# Patient Record
Sex: MALE | Race: WHITE | Employment: FULL TIME | URBAN - METROPOLITAN AREA
[De-identification: names, ages, dates, MRNs, and addresses within clinical notes are randomized per-mention and may not be internally consistent; named-entity substitution may affect disease eponyms.]

---

## 2019-10-06 ENCOUNTER — HOSPITAL ENCOUNTER (EMERGENCY)
Age: 29
Discharge: HOME | End: 2019-10-06
Payer: COMMERCIAL

## 2019-10-06 VITALS
WEIGHT: 195 LBS | OXYGEN SATURATION: 100 % | RESPIRATION RATE: 12 BRPM | HEART RATE: 87 BPM | TEMPERATURE: 98.2 F | BODY MASS INDEX: 25.03 KG/M2 | HEIGHT: 74 IN | SYSTOLIC BLOOD PRESSURE: 125 MMHG | DIASTOLIC BLOOD PRESSURE: 88 MMHG

## 2019-10-06 DIAGNOSIS — L02.411 ABSCESS OF RIGHT AXILLA: Primary | ICD-10-CM

## 2019-10-06 PROCEDURE — 4500000028 HC INTERMEDIATE PROCEDURE

## 2019-10-06 PROCEDURE — 2500000003 HC RX 250 WO HCPCS: Performed by: PHYSICIAN ASSISTANT

## 2019-10-06 PROCEDURE — 6370000000 HC RX 637 (ALT 250 FOR IP): Performed by: PHYSICIAN ASSISTANT

## 2019-10-06 PROCEDURE — 99282 EMERGENCY DEPT VISIT SF MDM: CPT

## 2019-10-06 RX ORDER — LIDOCAINE HYDROCHLORIDE 20 MG/ML
10 INJECTION, SOLUTION INFILTRATION; PERINEURAL ONCE
Status: COMPLETED | OUTPATIENT
Start: 2019-10-06 | End: 2019-10-06

## 2019-10-06 RX ORDER — SULFAMETHOXAZOLE AND TRIMETHOPRIM 800; 160 MG/1; MG/1
1 TABLET ORAL ONCE
Status: COMPLETED | OUTPATIENT
Start: 2019-10-06 | End: 2019-10-06

## 2019-10-06 RX ORDER — SULFAMETHOXAZOLE AND TRIMETHOPRIM 800; 160 MG/1; MG/1
1 TABLET ORAL 2 TIMES DAILY
Qty: 14 TABLET | Refills: 0 | Status: SHIPPED | OUTPATIENT
Start: 2019-10-06 | End: 2019-10-13

## 2019-10-06 RX ADMIN — LIDOCAINE HYDROCHLORIDE 10 ML: 20 INJECTION, SOLUTION INFILTRATION; PERINEURAL at 13:06

## 2019-10-06 RX ADMIN — SULFAMETHOXAZOLE AND TRIMETHOPRIM 1 TABLET: 800; 160 TABLET ORAL at 13:06

## 2019-10-06 ASSESSMENT — PAIN SCALES - GENERAL: PAINLEVEL_OUTOF10: 5

## 2019-10-06 ASSESSMENT — PAIN DESCRIPTION - ORIENTATION: ORIENTATION: LEFT

## 2019-10-06 ASSESSMENT — PAIN DESCRIPTION - PAIN TYPE: TYPE: ACUTE PAIN

## 2019-10-06 ASSESSMENT — PAIN DESCRIPTION - LOCATION: LOCATION: ARM

## 2023-10-17 LAB
ALBUMIN SERPL-MCNC: 4.8 G/DL
ALP BLD-CCNC: 102 U/L
ALT SERPL-CCNC: 42 U/L
ANION GAP SERPL CALCULATED.3IONS-SCNC: 2.2 MMOL/L
AST SERPL-CCNC: 39 U/L
AVERAGE GLUCOSE: NORMAL
BASOPHILS ABSOLUTE: 0.1 /ΜL
BASOPHILS RELATIVE PERCENT: 1 %
BILIRUB SERPL-MCNC: 1.3 MG/DL (ref 0.1–1.4)
BUN BLDV-MCNC: 16 MG/DL
CALCIUM SERPL-MCNC: 9.5 MG/DL
CHLORIDE BLD-SCNC: 101 MMOL/L
CHOLESTEROL, TOTAL: 236 MG/DL
CHOLESTEROL/HDL RATIO: ABNORMAL
CO2: NORMAL
CREAT SERPL-MCNC: 1.05 MG/DL
EGFR: 95
EOSINOPHILS ABSOLUTE: 0.4 /ΜL
EOSINOPHILS RELATIVE PERCENT: 5 %
GLUCOSE BLD-MCNC: 85 MG/DL
HBA1C MFR BLD: 5.5 %
HCT VFR BLD CALC: 44.2 % (ref 41–53)
HDLC SERPL-MCNC: 50 MG/DL (ref 35–70)
HEMOGLOBIN: 14.8 G/DL (ref 13.5–17.5)
LDL CHOLESTEROL CALCULATED: 178 MG/DL (ref 0–160)
LYMPHOCYTES ABSOLUTE: 2.9 /ΜL
LYMPHOCYTES RELATIVE PERCENT: 36 %
MCH RBC QN AUTO: 29.7 PG
MCHC RBC AUTO-ENTMCNC: 33.5 G/DL
MCV RBC AUTO: 89 FL
MONOCYTES ABSOLUTE: 0.7 /ΜL
MONOCYTES RELATIVE PERCENT: 9 %
NEUTROPHILS ABSOLUTE: 3.8 /ΜL
NEUTROPHILS RELATIVE PERCENT: 49 %
NONHDLC SERPL-MCNC: ABNORMAL MG/DL
PLATELET # BLD: 241 K/ΜL
PMV BLD AUTO: NORMAL FL
POTASSIUM SERPL-SCNC: 4.4 MMOL/L
RBC # BLD: 13.5 10^6/ΜL
SODIUM BLD-SCNC: 141 MMOL/L
TOTAL PROTEIN: 7
TRIGL SERPL-MCNC: 50 MG/DL
VITAMIN D 25-HYDROXY: 36.3
VITAMIN D2, 25 HYDROXY: NORMAL
VITAMIN D3,25 HYDROXY: NORMAL
VLDLC SERPL CALC-MCNC: 8 MG/DL
WBC # BLD: 7.9 10^3/ML

## 2023-10-23 ENCOUNTER — TELEPHONE (OUTPATIENT)
Dept: INTERNAL MEDICINE CLINIC | Age: 33
End: 2023-10-23

## 2023-10-23 NOTE — TELEPHONE ENCOUNTER
----- Message from April Andrea sent at 10/20/2023  4:09 PM EDT -----  Subject: Appointment Request    Reason for Call: New Patient/New to Provider Appointment needed: New   Patient Request Appointment    QUESTIONS    Reason for appointment request? Requested Provider unavailable - dr. Beatrice Jose     Additional Information for Provider? Patient has not been seen by dr. Beatrice Jose since 2019. per patient a family member of his wife's spoke with   dr. Beatrice Jose personally who states she would accept this patient and his   wife, and to call into the office to speak with Edd Morejon to get booked. no   appointments when I searched for patient.  please call patients wife marilee   back to advise, thank you   ---------------------------------------------------------------------------  --------------  [facility]  [phone]; OK to leave message on voicemail  ---------------------------------------------------------------------------  --------------  SCRIPT ANSWERS

## 2023-11-16 ENCOUNTER — OFFICE VISIT (OUTPATIENT)
Dept: INTERNAL MEDICINE CLINIC | Age: 33
End: 2023-11-16
Payer: COMMERCIAL

## 2023-11-16 VITALS
BODY MASS INDEX: 26.82 KG/M2 | HEIGHT: 74 IN | SYSTOLIC BLOOD PRESSURE: 118 MMHG | DIASTOLIC BLOOD PRESSURE: 72 MMHG | HEART RATE: 52 BPM | OXYGEN SATURATION: 99 % | WEIGHT: 209 LBS

## 2023-11-16 DIAGNOSIS — K42.9 UMBILICAL HERNIA WITHOUT OBSTRUCTION AND WITHOUT GANGRENE: ICD-10-CM

## 2023-11-16 DIAGNOSIS — Z00.00 ANNUAL PHYSICAL EXAM: Primary | ICD-10-CM

## 2023-11-16 DIAGNOSIS — E78.5 HYPERLIPIDEMIA, UNSPECIFIED HYPERLIPIDEMIA TYPE: ICD-10-CM

## 2023-11-16 DIAGNOSIS — E03.9 HYPOTHYROIDISM, UNSPECIFIED TYPE: ICD-10-CM

## 2023-11-16 PROCEDURE — 99385 PREV VISIT NEW AGE 18-39: CPT | Performed by: FAMILY MEDICINE

## 2023-11-16 SDOH — ECONOMIC STABILITY: HOUSING INSECURITY
IN THE LAST 12 MONTHS, WAS THERE A TIME WHEN YOU DID NOT HAVE A STEADY PLACE TO SLEEP OR SLEPT IN A SHELTER (INCLUDING NOW)?: NO

## 2023-11-16 SDOH — ECONOMIC STABILITY: INCOME INSECURITY: HOW HARD IS IT FOR YOU TO PAY FOR THE VERY BASICS LIKE FOOD, HOUSING, MEDICAL CARE, AND HEATING?: NOT HARD AT ALL

## 2023-11-16 SDOH — ECONOMIC STABILITY: FOOD INSECURITY: WITHIN THE PAST 12 MONTHS, YOU WORRIED THAT YOUR FOOD WOULD RUN OUT BEFORE YOU GOT MONEY TO BUY MORE.: NEVER TRUE

## 2023-11-16 SDOH — ECONOMIC STABILITY: FOOD INSECURITY: WITHIN THE PAST 12 MONTHS, THE FOOD YOU BOUGHT JUST DIDN'T LAST AND YOU DIDN'T HAVE MONEY TO GET MORE.: NEVER TRUE

## 2023-11-16 ASSESSMENT — ENCOUNTER SYMPTOMS
ABDOMINAL PAIN: 0
BACK PAIN: 0
DIARRHEA: 0
CONSTIPATION: 0
COUGH: 0
NAUSEA: 0
BLOOD IN STOOL: 0
SHORTNESS OF BREATH: 0

## 2023-11-16 ASSESSMENT — PATIENT HEALTH QUESTIONNAIRE - PHQ9
SUM OF ALL RESPONSES TO PHQ QUESTIONS 1-9: 0
SUM OF ALL RESPONSES TO PHQ QUESTIONS 1-9: 0
SUM OF ALL RESPONSES TO PHQ9 QUESTIONS 1 & 2: 0
SUM OF ALL RESPONSES TO PHQ QUESTIONS 1-9: 0
2. FEELING DOWN, DEPRESSED OR HOPELESS: 0
1. LITTLE INTEREST OR PLEASURE IN DOING THINGS: 0
SUM OF ALL RESPONSES TO PHQ QUESTIONS 1-9: 0

## 2023-11-17 ENCOUNTER — NURSE ONLY (OUTPATIENT)
Dept: INTERNAL MEDICINE CLINIC | Age: 33
End: 2023-11-17
Payer: COMMERCIAL

## 2023-11-17 DIAGNOSIS — E03.9 HYPOTHYROIDISM, UNSPECIFIED TYPE: Primary | ICD-10-CM

## 2023-11-17 LAB
T3FREE SERPL-MCNC: 3 PG/ML (ref 2.3–4.2)
T4 FREE SERPL-MCNC: 0.8 NG/DL (ref 0.9–1.8)
THYROPEROXIDASE AB SERPL IA-ACNC: 57 IU/ML

## 2023-11-17 PROCEDURE — 36415 COLL VENOUS BLD VENIPUNCTURE: CPT | Performed by: FAMILY MEDICINE

## 2023-11-23 LAB — THYROGLOB SERPL-MCNC: 21.7 NG/ML (ref 1.3–31.8)

## 2023-11-28 DIAGNOSIS — E06.3 AUTOIMMUNE HYPOTHYROIDISM: Primary | ICD-10-CM

## 2023-11-28 RX ORDER — LEVOTHYROXINE SODIUM 0.05 MG/1
50 TABLET ORAL EVERY MORNING
Qty: 30 TABLET | Refills: 2 | Status: SHIPPED | OUTPATIENT
Start: 2023-11-28

## 2023-12-15 DIAGNOSIS — E06.3 AUTOIMMUNE HYPOTHYROIDISM: ICD-10-CM

## 2023-12-15 RX ORDER — LEVOTHYROXINE SODIUM 0.05 MG/1
50 TABLET ORAL EVERY MORNING
Qty: 90 TABLET | Refills: 0 | Status: SHIPPED | OUTPATIENT
Start: 2023-12-15

## 2024-02-06 DIAGNOSIS — E06.3 AUTOIMMUNE HYPOTHYROIDISM: ICD-10-CM

## 2024-02-06 RX ORDER — LEVOTHYROXINE SODIUM 0.05 MG/1
50 TABLET ORAL EVERY MORNING
Qty: 90 TABLET | Refills: 0 | Status: SHIPPED | OUTPATIENT
Start: 2024-02-06

## 2024-02-15 ENCOUNTER — OFFICE VISIT (OUTPATIENT)
Dept: INTERNAL MEDICINE CLINIC | Age: 34
End: 2024-02-15
Payer: COMMERCIAL

## 2024-02-15 VITALS
OXYGEN SATURATION: 98 % | HEIGHT: 74 IN | BODY MASS INDEX: 27.23 KG/M2 | HEART RATE: 56 BPM | WEIGHT: 212.2 LBS | SYSTOLIC BLOOD PRESSURE: 112 MMHG | DIASTOLIC BLOOD PRESSURE: 78 MMHG

## 2024-02-15 DIAGNOSIS — E06.3 AUTOIMMUNE HYPOTHYROIDISM: Primary | ICD-10-CM

## 2024-02-15 DIAGNOSIS — E78.5 HYPERLIPIDEMIA, UNSPECIFIED HYPERLIPIDEMIA TYPE: ICD-10-CM

## 2024-02-15 PROCEDURE — 99214 OFFICE O/P EST MOD 30 MIN: CPT | Performed by: FAMILY MEDICINE

## 2024-02-15 NOTE — PROGRESS NOTES
Harrison Harvey (:  1990) is a 33 y.o. male,established patient, here for evaluation of the following chief complaint(s):  Follow-up, Hypothyroidism, and Hyperlipidemia         ASSESSMENT/PLAN:  1. Autoimmune hypothyroidism  Continue levothyroxine 50  -TSH  -Free T4    2. Hyperlipidemia, unspecified hyperlipidemia type  The patient is asked to make an attempt to improve diet and exercise patterns     Medications reconciled and discussed with the patient  Return for follow up in 5 to 6 months for hypothyroidism.       Lab Results   Component Value Date    WBC 7.9 10/17/2023    HGB 14.8 10/17/2023    HCT 44.2 10/17/2023    MCV 89 10/17/2023     10/17/2023     Lab Results   Component Value Date    CHOL 236 10/17/2023     Lab Results   Component Value Date    TRIG 50 10/17/2023     Lab Results   Component Value Date    HDL 50 10/17/2023     Lab Results   Component Value Date    LDLCALC 178 (A) 10/17/2023     Lab Results   Component Value Date    LABA1C 5.5 10/17/2023       Lab Results   Component Value Date     10/17/2023    K 4.4 10/17/2023     10/17/2023    CO2 30 2012    BUN 16 10/17/2023    CREATININE 1.05 10/17/2023    GLUCOSE 85 10/17/2023    CALCIUM 9.5 10/17/2023    PROT 7.0 2012    LABALBU 4.8 10/17/2023    BILITOT 1.3 10/17/2023    ALKPHOS 102 10/17/2023    AST 39 10/17/2023    ALT 42 10/17/2023    LABGLOM 95 10/17/2023    GFRAA >60 2012       Lab Results   Component Value Date/Time    COLORU COLORLESS 2012 03:24 PM    LABPH 5.5 2012 03:24 PM    NITRU NEGATIVE 2012 03:24 PM    KETUA NEGATIVE 2012 03:24 PM    UROBILINOGEN 0.2 2012 03:24 PM    BILIRUBINUR NEGATIVE 2012 03:24 PM       Subjective   SUBJECTIVE/OBJECTIVE:    HISTORY OF PRESENT ILLNESS:  This is a 33 y.o. male here for the following:  Patient Active Problem List    Diagnosis Date Noted    Hyperlipidemia 2023    Hypothyroidism 2023    Umbilical hernia

## 2024-02-16 LAB
T4 FREE SERPL-MCNC: 1 NG/DL (ref 0.9–1.8)
TSH SERPL DL<=0.005 MIU/L-ACNC: 7.58 UIU/ML (ref 0.27–4.2)

## 2024-02-26 ENCOUNTER — TELEPHONE (OUTPATIENT)
Dept: INTERNAL MEDICINE CLINIC | Age: 34
End: 2024-02-26

## 2024-02-26 NOTE — TELEPHONE ENCOUNTER
No answer. It has been hard to reach the pt.   His TSH is still elevated. I would like for him to use 75 mcg of the levothyroxine. He can take one and one-half tablet of the levothyroxine RX that he currently has until he is almost out and I can send in the new dose

## 2024-05-08 DIAGNOSIS — E06.3 AUTOIMMUNE HYPOTHYROIDISM: ICD-10-CM

## 2024-05-08 RX ORDER — LEVOTHYROXINE SODIUM 0.05 MG/1
50 TABLET ORAL EVERY MORNING
Qty: 90 TABLET | Refills: 0 | Status: SHIPPED | OUTPATIENT
Start: 2024-05-08

## 2024-07-01 DIAGNOSIS — E06.3 AUTOIMMUNE HYPOTHYROIDISM: ICD-10-CM

## 2024-07-01 RX ORDER — LEVOTHYROXINE SODIUM 0.05 MG/1
50 TABLET ORAL EVERY MORNING
Qty: 90 TABLET | Refills: 0 | Status: CANCELLED | OUTPATIENT
Start: 2024-07-01

## 2024-07-01 RX ORDER — LEVOTHYROXINE SODIUM 0.07 MG/1
75 TABLET ORAL DAILY
Qty: 90 TABLET | Refills: 0 | Status: SHIPPED | OUTPATIENT
Start: 2024-07-01

## 2024-07-01 NOTE — TELEPHONE ENCOUNTER
Patient's Wife called stating that patient was told by PCP that this medication will be upped to 75 Mg for next refill. Needs new script

## 2024-08-26 ENCOUNTER — TELEPHONE (OUTPATIENT)
Dept: INTERNAL MEDICINE CLINIC | Age: 34
End: 2024-08-26

## 2024-08-26 ENCOUNTER — OFFICE VISIT (OUTPATIENT)
Dept: INTERNAL MEDICINE CLINIC | Age: 34
End: 2024-08-26
Payer: COMMERCIAL

## 2024-08-26 VITALS
HEIGHT: 74 IN | DIASTOLIC BLOOD PRESSURE: 78 MMHG | WEIGHT: 204 LBS | HEART RATE: 57 BPM | BODY MASS INDEX: 26.18 KG/M2 | OXYGEN SATURATION: 92 % | SYSTOLIC BLOOD PRESSURE: 120 MMHG

## 2024-08-26 DIAGNOSIS — E06.3 AUTOIMMUNE HYPOTHYROIDISM: Primary | ICD-10-CM

## 2024-08-26 DIAGNOSIS — E04.9 ENLARGED THYROID: ICD-10-CM

## 2024-08-26 DIAGNOSIS — E78.5 HYPERLIPIDEMIA, UNSPECIFIED HYPERLIPIDEMIA TYPE: ICD-10-CM

## 2024-08-26 PROCEDURE — 99214 OFFICE O/P EST MOD 30 MIN: CPT | Performed by: FAMILY MEDICINE

## 2024-08-26 ASSESSMENT — ENCOUNTER SYMPTOMS
SHORTNESS OF BREATH: 0
ABDOMINAL PAIN: 0
NAUSEA: 0
COUGH: 0

## 2024-08-26 NOTE — PROGRESS NOTES
Harrison Harvey (:  1990) is a 34 y.o. male,established patient, here for evaluation of the following chief complaint(s):  Follow-up (6 month f/u), Hypothyroidism, and Hyperlipidemia      Assessment & Plan   ASSESSMENT/PLAN:  1. Autoimmune hypothyroidism  Continue levothyroxine 75 mcg  - US HEAD NECK SOFT TISSUE THYROID; Future    2. Hyperlipidemia, unspecified hyperlipidemia type  The patient is asked to make an attempt to improve diet     3. Enlarged thyroid  - US HEAD NECK SOFT TISSUE THYROID; Future    Patient will have Well[facility] labs per his employer  Medications reconciled and discussed with the patient  Return in about 6 months (around 2025) for annual exam.       Lab Results   Component Value Date    WBC 7.9 10/17/2023    HGB 14.8 10/17/2023    HCT 44.2 10/17/2023    MCV 89 10/17/2023     10/17/2023     Lab Results   Component Value Date    CHOL 236 10/17/2023     Lab Results   Component Value Date    TRIG 50 10/17/2023     Lab Results   Component Value Date    HDL 50 10/17/2023     Lab Results   Component Value Date     (A) 10/17/2023       Lab Results   Component Value Date    LABA1C 5.5 10/17/2023       Lab Results   Component Value Date     10/17/2023    K 4.4 10/17/2023     10/17/2023    CO2 30 2012    BUN 16 10/17/2023    CREATININE 1.05 10/17/2023    GLUCOSE 85 10/17/2023    CALCIUM 9.5 10/17/2023    BILITOT 1.3 10/17/2023    ALKPHOS 102 10/17/2023    AST 39 10/17/2023    ALT 42 10/17/2023    LABGLOM 95 10/17/2023    GFRAA >60 2012     Lab Results   Component Value Date    TSH 7.58 (H) 02/15/2024     T4 Free  0.9 - 1.8 ng/dL 1.0     Lab Results   Component Value Date/Time    COLORU COLORLESS 2012 03:24 PM    NITRU NEGATIVE 2012 03:24 PM    GLUCOSEU NEGATIVE 2012 03:24 PM    KETUA NEGATIVE 2012 03:24 PM    UROBILINOGEN 0.2 2012 03:24 PM    BILIRUBINUR NEGATIVE 2012 03:24 PM       Subjective

## 2024-09-23 DIAGNOSIS — E06.3 AUTOIMMUNE HYPOTHYROIDISM: ICD-10-CM

## 2024-09-23 RX ORDER — LEVOTHYROXINE SODIUM 75 UG/1
75 TABLET ORAL DAILY
Qty: 90 TABLET | Refills: 0 | Status: SHIPPED | OUTPATIENT
Start: 2024-09-23

## 2024-10-04 ENCOUNTER — HOSPITAL ENCOUNTER (OUTPATIENT)
Dept: ULTRASOUND IMAGING | Age: 34
Discharge: HOME | End: 2024-10-04
Payer: COMMERCIAL

## 2024-10-04 DIAGNOSIS — E04.9 ENLARGED THYROID: ICD-10-CM

## 2024-10-04 DIAGNOSIS — E06.3 AUTOIMMUNE HYPOTHYROIDISM: ICD-10-CM

## 2024-10-04 PROCEDURE — 76536 US EXAM OF HEAD AND NECK: CPT

## 2024-12-21 DIAGNOSIS — E06.3 AUTOIMMUNE HYPOTHYROIDISM: ICD-10-CM

## 2024-12-23 RX ORDER — LEVOTHYROXINE SODIUM 75 UG/1
75 TABLET ORAL DAILY
Qty: 90 TABLET | Refills: 0 | Status: SHIPPED | OUTPATIENT
Start: 2024-12-23

## 2025-03-12 ENCOUNTER — HOSPITAL ENCOUNTER (OUTPATIENT)
Age: 35
Discharge: HOME | End: 2025-03-12
Payer: COMMERCIAL

## 2025-03-12 DIAGNOSIS — E03.9 HYPOTHYROIDISM, UNSPECIFIED TYPE: ICD-10-CM

## 2025-03-12 DIAGNOSIS — D72.821 MONOCYTOSIS: ICD-10-CM

## 2025-03-12 DIAGNOSIS — R74.8 ELEVATED LIVER ENZYMES: ICD-10-CM

## 2025-03-12 LAB
ALBUMIN SERPL-MCNC: 4.4 G/DL (ref 3.4–5)
ALBUMIN/GLOB SERPL: 1.7 {RATIO} (ref 1.1–2.2)
ALP SERPL-CCNC: 96 U/L (ref 40–129)
ALT SERPL-CCNC: 32 U/L (ref 10–40)
AST SERPL-CCNC: 39 U/L (ref 15–37)
BASOPHILS # BLD: 0.06 K/UL
BASOPHILS NFR BLD: 1 % (ref 0–1)
BILIRUB DIRECT SERPL-MCNC: 0.4 MG/DL (ref 0–0.3)
BILIRUB INDIRECT SERPL-MCNC: 0.7 MG/DL (ref 0–0.7)
BILIRUB SERPL-MCNC: 1.1 MG/DL (ref 0–1)
EOSINOPHIL # BLD: 0.53 K/UL
EOSINOPHILS RELATIVE PERCENT: 8 % (ref 0–3)
ERYTHROCYTE [DISTWIDTH] IN BLOOD BY AUTOMATED COUNT: 13.2 % (ref 11.7–14.9)
HCT VFR BLD AUTO: 46.4 % (ref 42–52)
HGB BLD-MCNC: 15.5 G/DL (ref 13.5–18)
IMM GRANULOCYTES # BLD AUTO: 0.01 K/UL
IMM GRANULOCYTES NFR BLD: 0 %
LYMPHOCYTES NFR BLD: 2.94 K/UL
LYMPHOCYTES RELATIVE PERCENT: 43 % (ref 24–44)
MCH RBC QN AUTO: 30.2 PG (ref 27–31)
MCHC RBC AUTO-ENTMCNC: 33.4 G/DL (ref 32–36)
MCV RBC AUTO: 90.3 FL (ref 78–100)
MONOCYTES NFR BLD: 0.87 K/UL
MONOCYTES NFR BLD: 13 % (ref 0–4)
NEUTROPHILS NFR BLD: 36 % (ref 36–66)
NEUTS SEG NFR BLD: 2.45 K/UL
PLATELET # BLD AUTO: 273 K/UL (ref 140–440)
PMV BLD AUTO: 9.9 FL (ref 7.5–11.1)
PROT SERPL-MCNC: 7 G/DL (ref 6.4–8.2)
RBC # BLD AUTO: 5.14 M/UL (ref 4.6–6.2)
T4 FREE SERPL-MCNC: 1 NG/DL (ref 0.9–1.8)
TSH SERPL DL<=0.05 MIU/L-ACNC: 11 UIU/ML (ref 0.27–4.2)
WBC OTHER # BLD: 6.9 K/UL (ref 4–10.5)

## 2025-03-12 PROCEDURE — 85025 COMPLETE CBC W/AUTO DIFF WBC: CPT

## 2025-03-12 PROCEDURE — 84443 ASSAY THYROID STIM HORMONE: CPT

## 2025-03-12 PROCEDURE — 84439 ASSAY OF FREE THYROXINE: CPT

## 2025-03-12 PROCEDURE — 80076 HEPATIC FUNCTION PANEL: CPT

## 2025-03-12 ASSESSMENT — PATIENT HEALTH QUESTIONNAIRE - PHQ9
2. FEELING DOWN, DEPRESSED OR HOPELESS: NOT AT ALL
2. FEELING DOWN, DEPRESSED OR HOPELESS: NOT AT ALL
SUM OF ALL RESPONSES TO PHQ QUESTIONS 1-9: 0
SUM OF ALL RESPONSES TO PHQ9 QUESTIONS 1 & 2: 0
SUM OF ALL RESPONSES TO PHQ QUESTIONS 1-9: 0
1. LITTLE INTEREST OR PLEASURE IN DOING THINGS: NOT AT ALL
1. LITTLE INTEREST OR PLEASURE IN DOING THINGS: NOT AT ALL

## 2025-03-13 ENCOUNTER — OFFICE VISIT (OUTPATIENT)
Dept: INTERNAL MEDICINE CLINIC | Age: 35
End: 2025-03-13
Payer: COMMERCIAL

## 2025-03-13 VITALS
OXYGEN SATURATION: 98 % | SYSTOLIC BLOOD PRESSURE: 120 MMHG | WEIGHT: 199 LBS | DIASTOLIC BLOOD PRESSURE: 64 MMHG | BODY MASS INDEX: 25.55 KG/M2 | HEART RATE: 64 BPM

## 2025-03-13 DIAGNOSIS — R74.8 ELEVATED LIVER ENZYMES: ICD-10-CM

## 2025-03-13 DIAGNOSIS — E06.3 AUTOIMMUNE HYPOTHYROIDISM: ICD-10-CM

## 2025-03-13 DIAGNOSIS — L30.9 DERMATITIS: ICD-10-CM

## 2025-03-13 DIAGNOSIS — Z79.899 LONG TERM USE OF DRUG: ICD-10-CM

## 2025-03-13 DIAGNOSIS — R17 ELEVATED BILIRUBIN: ICD-10-CM

## 2025-03-13 DIAGNOSIS — Z00.00 ANNUAL PHYSICAL EXAM: Primary | ICD-10-CM

## 2025-03-13 PROCEDURE — 99213 OFFICE O/P EST LOW 20 MIN: CPT | Performed by: FAMILY MEDICINE

## 2025-03-13 PROCEDURE — 99395 PREV VISIT EST AGE 18-39: CPT | Performed by: FAMILY MEDICINE

## 2025-03-13 RX ORDER — TRIAMCINOLONE ACETONIDE 1 MG/G
OINTMENT TOPICAL
Qty: 30 G | Refills: 0 | Status: SHIPPED | OUTPATIENT
Start: 2025-03-13 | End: 2025-03-20

## 2025-03-13 RX ORDER — LEVOTHYROXINE SODIUM 100 UG/1
100 TABLET ORAL DAILY
Qty: 90 TABLET | Refills: 0 | Status: SHIPPED | OUTPATIENT
Start: 2025-03-13

## 2025-03-13 SDOH — ECONOMIC STABILITY: FOOD INSECURITY: WITHIN THE PAST 12 MONTHS, THE FOOD YOU BOUGHT JUST DIDN'T LAST AND YOU DIDN'T HAVE MONEY TO GET MORE.: NEVER TRUE

## 2025-03-13 SDOH — ECONOMIC STABILITY: FOOD INSECURITY: WITHIN THE PAST 12 MONTHS, YOU WORRIED THAT YOUR FOOD WOULD RUN OUT BEFORE YOU GOT MONEY TO BUY MORE.: NEVER TRUE

## 2025-03-13 NOTE — PROGRESS NOTES
[facility] Adult Note  Name: Harrison Harvey Today’s Date: 3/13/2025   MRN: [id number] Sex: Male   Age: 34 y.o. Ethnicity: Non- / Non    : 1990 Race: White (non-)        History:  Patient Active Problem List    Diagnosis Date Noted    Hyperlipidemia 2023    Hypothyroidism 2023    Umbilical hernia without obstruction and without gangrene 2023       History of Present Illness  The patient is a 34-year-old male who presents today for an annual wellness visit.    He has a known history of autoimmune hypothyroidism, which is showing signs of improvement. He is currently on levothyroxine 75 mcg. Previous TSH 20.8 on [facility] results. TSH has decreased to 11    He reports an itchy rash on both arms and the lower left side of his back. He is not aware of any new exposures that could have triggered this condition.    Recent lab tests revealed elevated liver enzymes. He is willing to obtain an ultrasound of the liver for further evaluation.    Increased stressors at work      Review of Systems   Constitutional:  Negative for diaphoresis and fever.   HENT: Negative.     Eyes:  Negative for visual disturbance.   Respiratory:  Negative for cough and shortness of breath.    Cardiovascular:  Negative for chest pain and palpitations.   Gastrointestinal:  Negative for abdominal pain, blood in stool, constipation, diarrhea and nausea.   Genitourinary:  Negative for difficulty urinating and dysuria.   Musculoskeletal:  Negative for back pain.   Skin:  Positive for rash.   Neurological:  Negative for dizziness, light-headedness and headaches.   Psychiatric/Behavioral:  Negative for dysphoric mood.        No Known Allergies      Prior to Visit Medications    Medication Sig Taking? Authorizing Provider   Coenzyme Q10 (COQ10 PO) Take by mouth Yes Bernadette Soria MD   SELENIUM PO Take by mouth Yes Bernadette Soria MD   vitamin D-3 125 MCG (5000 UT) TABS tablet Take 1 tablet by mouth

## 2025-04-18 ENCOUNTER — HOSPITAL ENCOUNTER (OUTPATIENT)
Dept: ULTRASOUND IMAGING | Age: 35
Discharge: HOME | End: 2025-04-18
Payer: COMMERCIAL

## 2025-04-18 DIAGNOSIS — R17 ELEVATED BILIRUBIN: ICD-10-CM

## 2025-04-18 DIAGNOSIS — R74.8 ELEVATED LIVER ENZYMES: ICD-10-CM

## 2025-04-18 PROCEDURE — 76705 ECHO EXAM OF ABDOMEN: CPT

## 2025-04-22 ENCOUNTER — RESULTS FOLLOW-UP (OUTPATIENT)
Dept: INTERNAL MEDICINE CLINIC | Age: 35
End: 2025-04-22

## 2025-04-22 DIAGNOSIS — R74.8 ELEVATED LIVER ENZYMES: Primary | ICD-10-CM

## 2025-06-13 DIAGNOSIS — E06.3 AUTOIMMUNE HYPOTHYROIDISM: ICD-10-CM

## 2025-06-13 RX ORDER — LEVOTHYROXINE SODIUM 100 UG/1
100 TABLET ORAL DAILY
Qty: 90 TABLET | Refills: 0 | Status: SHIPPED | OUTPATIENT
Start: 2025-06-13

## 2025-06-19 ENCOUNTER — OFFICE VISIT (OUTPATIENT)
Dept: INTERNAL MEDICINE CLINIC | Age: 35
End: 2025-06-19
Payer: COMMERCIAL

## 2025-06-19 ENCOUNTER — HOSPITAL ENCOUNTER (OUTPATIENT)
Age: 35
Discharge: HOME | End: 2025-06-19
Payer: COMMERCIAL

## 2025-06-19 VITALS
BODY MASS INDEX: 27.28 KG/M2 | DIASTOLIC BLOOD PRESSURE: 82 MMHG | OXYGEN SATURATION: 98 % | HEART RATE: 60 BPM | WEIGHT: 212.6 LBS | RESPIRATION RATE: 16 BRPM | HEIGHT: 74 IN | SYSTOLIC BLOOD PRESSURE: 122 MMHG

## 2025-06-19 DIAGNOSIS — Z79.899 LONG TERM USE OF DRUG: ICD-10-CM

## 2025-06-19 DIAGNOSIS — R74.8 ELEVATED LIVER ENZYMES: ICD-10-CM

## 2025-06-19 DIAGNOSIS — E06.3 AUTOIMMUNE HYPOTHYROIDISM: Primary | ICD-10-CM

## 2025-06-19 DIAGNOSIS — K90.49 FOOD INTOLERANCE: ICD-10-CM

## 2025-06-19 DIAGNOSIS — E06.3 AUTOIMMUNE HYPOTHYROIDISM: ICD-10-CM

## 2025-06-19 DIAGNOSIS — L30.9 DERMATITIS: ICD-10-CM

## 2025-06-19 LAB
ALBUMIN SERPL-MCNC: 4.4 G/DL (ref 3.4–5)
ALBUMIN/GLOB SERPL: 1.8 {RATIO} (ref 1.1–2.2)
ALP SERPL-CCNC: 104 U/L (ref 40–129)
ALT SERPL-CCNC: 39 U/L (ref 10–40)
AST SERPL-CCNC: 30 U/L (ref 15–37)
BASOPHILS # BLD: 0.05 K/UL
BASOPHILS NFR BLD: 1 % (ref 0–1)
BILIRUB DIRECT SERPL-MCNC: 0.2 MG/DL (ref 0–0.3)
BILIRUB INDIRECT SERPL-MCNC: 0.4 MG/DL (ref 0–0.7)
BILIRUB SERPL-MCNC: 0.6 MG/DL (ref 0–1)
EOSINOPHIL # BLD: 0.67 K/UL
EOSINOPHILS RELATIVE PERCENT: 10 % (ref 0–3)
ERYTHROCYTE [DISTWIDTH] IN BLOOD BY AUTOMATED COUNT: 13 % (ref 11.7–14.9)
HCT VFR BLD AUTO: 44.4 % (ref 42–52)
HGB BLD-MCNC: 14.9 G/DL (ref 13.5–18)
IMM GRANULOCYTES # BLD AUTO: 0.01 K/UL
IMM GRANULOCYTES NFR BLD: 0 %
LYMPHOCYTES NFR BLD: 2.76 K/UL
LYMPHOCYTES RELATIVE PERCENT: 42 % (ref 24–44)
MCH RBC QN AUTO: 29.6 PG (ref 27–31)
MCHC RBC AUTO-ENTMCNC: 33.6 G/DL (ref 32–36)
MCV RBC AUTO: 88.1 FL (ref 78–100)
MONOCYTES NFR BLD: 0.84 K/UL
MONOCYTES NFR BLD: 13 % (ref 0–5)
NEUTROPHILS NFR BLD: 35 % (ref 36–66)
NEUTS SEG NFR BLD: 2.29 K/UL
PLATELET # BLD AUTO: 233 K/UL (ref 140–440)
PMV BLD AUTO: 9.5 FL (ref 7.5–11.1)
PROT SERPL-MCNC: 6.8 G/DL (ref 6.4–8.2)
RBC # BLD AUTO: 5.04 M/UL (ref 4.6–6.2)
T4 FREE SERPL-MCNC: 1 NG/DL (ref 0.9–1.8)
TSH SERPL DL<=0.05 MIU/L-ACNC: 6.77 UIU/ML (ref 0.27–4.2)
WBC OTHER # BLD: 6.6 K/UL (ref 4–10.5)

## 2025-06-19 PROCEDURE — 80076 HEPATIC FUNCTION PANEL: CPT

## 2025-06-19 PROCEDURE — 84439 ASSAY OF FREE THYROXINE: CPT

## 2025-06-19 PROCEDURE — 83516 IMMUNOASSAY NONANTIBODY: CPT

## 2025-06-19 PROCEDURE — 99213 OFFICE O/P EST LOW 20 MIN: CPT | Performed by: FAMILY MEDICINE

## 2025-06-19 PROCEDURE — 84443 ASSAY THYROID STIM HORMONE: CPT

## 2025-06-19 PROCEDURE — 36415 COLL VENOUS BLD VENIPUNCTURE: CPT

## 2025-06-19 PROCEDURE — 85025 COMPLETE CBC W/AUTO DIFF WBC: CPT

## 2025-06-19 RX ORDER — METHYLPREDNISOLONE 4 MG/1
TABLET ORAL
Qty: 1 KIT | Refills: 0 | Status: SHIPPED | OUTPATIENT
Start: 2025-06-19 | End: 2025-06-25

## 2025-06-19 NOTE — PROGRESS NOTES
Harrison Harvey (:  1990) is a 35 y.o. male,established patient, here for evaluation of the following chief complaint(s):  3 Month Follow-Up (Patient presents for a 3 month follow up), and Hypothyroidism      Assessment & Plan   ASSESSMENT/PLAN:    Assessment & Plan  1. Autoimmune hypothyroidism.  He will continue taking levothyroxine 100 mcg tablet daily.   Labs will be obtained today to monitor thyroid function.    2. Elevated liver enzymes.  He reports no symptoms.   Labs will be obtained today to monitor liver function.    3. Dermatitis, recurrent.  Start  A Medrol Dosepak, 4 mg tablet, will be initiated, to be taken orally, with one kit dispensed and no refills.  ADR's explained  He may need to see dermatology    4. Food intolerance.  Patient prefers celiac testing  - GLIADIN ANTIBODY, IGA; Future  - GLIADIN ANTIBODY, IGG; Future    Medications reconciled and discussed with the patient  Return in about 6 months (around 2025) for Hypothyroidism.       Lab Results   Component Value Date    WBC 6.9 2025    HGB 15.5 2025    HCT 46.4 2025    MCV 90.3 2025     2025         Lab Results   Component Value Date    LABA1C 5.5 10/17/2023       Lab Results   Component Value Date     10/17/2023    K 4.4 10/17/2023     10/17/2023    CO2 30 2012    BUN 16 10/17/2023    CREATININE 1.05 10/17/2023    GLUCOSE 85 10/17/2023    CALCIUM 9.5 10/17/2023    BILITOT 1.1 (H) 2025    ALKPHOS 96 2025    AST 39 (H) 2025    ALT 32 2025    LABGLOM 95 10/17/2023    GFRAA >60 2012     Lab Results   Component Value Date    TSH 11.00 (H) 2025     Lab Results   Component Value Date/Time    COLORU COLORLESS 2012 03:24 PM    NITRU NEGATIVE 2012 03:24 PM    GLUCOSEU NEGATIVE 2012 03:24 PM    KETUA NEGATIVE 2012 03:24 PM    UROBILINOGEN 0.2 2012 03:24 PM    BILIRUBINUR NEGATIVE 2012 03:24 PM       Subjective

## 2025-06-20 ENCOUNTER — RESULTS FOLLOW-UP (OUTPATIENT)
Dept: INTERNAL MEDICINE CLINIC | Age: 35
End: 2025-06-20

## 2025-06-21 DIAGNOSIS — E03.9 HYPOTHYROIDISM, UNSPECIFIED TYPE: Primary | ICD-10-CM

## 2025-06-21 LAB
GLIADIN ANTIBODIES IGA: <0.2 U/ML (ref 0–14)
GLIADIN ANTIBODIES IGG: <0.4 U/ML (ref 0–14)

## 2025-06-21 RX ORDER — LEVOTHYROXINE SODIUM 25 UG/1
25 TABLET ORAL DAILY
Qty: 90 TABLET | Refills: 0 | Status: SHIPPED | OUTPATIENT
Start: 2025-06-21